# Patient Record
Sex: FEMALE | Race: WHITE | NOT HISPANIC OR LATINO | URBAN - METROPOLITAN AREA
[De-identification: names, ages, dates, MRNs, and addresses within clinical notes are randomized per-mention and may not be internally consistent; named-entity substitution may affect disease eponyms.]

---

## 2020-02-27 ENCOUNTER — AMBULATORY - HEALTHEAST (OUTPATIENT)
Dept: CARDIOLOGY | Facility: CLINIC | Age: 55
End: 2020-02-27

## 2020-02-27 ENCOUNTER — OFFICE VISIT - HEALTHEAST (OUTPATIENT)
Dept: CARDIOLOGY | Facility: CLINIC | Age: 55
End: 2020-02-27

## 2020-02-27 DIAGNOSIS — R07.2 PRECORDIAL PAIN: ICD-10-CM

## 2020-02-27 DIAGNOSIS — I31.9 PERICARDITIS: ICD-10-CM

## 2020-02-27 RX ORDER — OMEPRAZOLE 10 MG/1
10 CAPSULE, DELAYED RELEASE ORAL DAILY
Status: SHIPPED | COMMUNITY
Start: 2020-02-27

## 2020-02-27 RX ORDER — LEVOTHYROXINE SODIUM 125 UG/1
1 TABLET ORAL DAILY
Status: SHIPPED | COMMUNITY
Start: 2020-02-14

## 2020-02-27 ASSESSMENT — MIFFLIN-ST. JEOR: SCORE: 1325.24

## 2021-05-26 ENCOUNTER — RECORDS - HEALTHEAST (OUTPATIENT)
Dept: ADMINISTRATIVE | Facility: CLINIC | Age: 56
End: 2021-05-26

## 2021-06-04 VITALS
DIASTOLIC BLOOD PRESSURE: 74 MMHG | HEIGHT: 67 IN | HEART RATE: 76 BPM | RESPIRATION RATE: 16 BRPM | WEIGHT: 156 LBS | SYSTOLIC BLOOD PRESSURE: 116 MMHG | BODY MASS INDEX: 24.48 KG/M2

## 2021-06-06 NOTE — PATIENT INSTRUCTIONS - HE
1. Take 2 Aleve twice daily with food.  2. Call in 5 days if your pain is not significantly improved, we will schedule further testing.

## 2021-06-16 PROBLEM — I31.9 PERICARDITIS: Status: ACTIVE | Noted: 2020-02-27

## 2021-06-16 PROBLEM — R07.2 PRECORDIAL PAIN: Status: ACTIVE | Noted: 2020-02-27

## 2021-06-28 NOTE — PROGRESS NOTES
"Progress Notes by David Woodson MD at 2/27/2020  2:20 PM     Author: David Woodson MD Service: -- Author Type: Physician    Filed: 2/27/2020  3:12 PM Encounter Date: 2/27/2020 Status: Signed    : David Woodson MD (Physician)       CARDIOLOGY Rapid [facility] CLINIC CONSULT NOTE     Assessment/Plan:   1. Chest pain.  Persistent x48 hours, without evidence of myocardial infarction.  Suspect noncardiac etiology, possibly pericarditis though I hear no rub no effusion demonstrated on limited echo in ER.  Difficult to exclude entirely coronary disease, though appears unlikely.  Discussed coronary CT angiography or stress testing to exclude coronary disease as a cause.  Patient prefers a trial of empiric medical therapy first.  This is reasonable.  We will try Naprosyn twice daily for 5 days, with plan for coronary CT angiogram with calcium score if pain persists beyond this time.      Follow up dependent upon results treatment trial     History of Present Illness:     It is my pleasure to see Julia Marquis at the [facility] clinic for evaluation of chest pain.    Julia Marquis is a 54 y.o. female with a past medical history of hypothyroidism on treatment.  She also has a history of pericarditis from 20 years ago, treated with a course of anti-inflammatory agents.  She has a long history of hiatal hernia and chronic intermittent pain from this, well controlled with Prilosec OTC.  She does exercise regularly, though not as often as she did a couple of years ago.  She was on the elliptical with vigorous exercise 10 days ago with no problems.    2 days ago she awakened with sided chest \"heaviness\" without associated shortness of breath or diaphoresis.  She thought she was developing a viral syndrome but had no fevers or chills or sweats or rhinorrhea.  She then developed some lower chest burning sensation which persisted for 24 hours.  Then she sought evaluation in " "an urgent care center and subsequently in emergency room where ECG was normal troponins her D-dimers were within normal limits.  She did get some improvement with a Toradol injection.  She is referred here for follow-up.  The discomfort is \"4-5/10\" intensity, without change.  It is unaffected by food and is reminiscent of the discomfort she experienced with her pericarditis decades ago.  Is equivocally affected by deep respirations or position changes.    She does have a family history of coronary disease with her father suffering a myocardial infarction at age 58, and a sister at age 48.  She has been a vegetarian for decades, has no history of hyperlipidemia diabetes mellitus or tobacco abuse.    Past Medical History:     Patient Active Problem List   Diagnosis   ? Precordial pain       Past Surgical History:   History reviewed. No pertinent surgical history.    Family History:     Family History   Problem Relation Age of Onset   ? Acute Myocardial Infarction Father 58   ? Stroke Father 58     Family history reviewed and is not pertinent to the chief complaint or presenting problem    Social History:    reports that she has never smoked. She has never used smokeless tobacco. She reports that she does not use drugs.    Exercise: Elliptical exercise 10 days ago.  Working her way back up to 6 days a week like she was doing 2 years ago before an elbow injury    Sleep: Restorative, no daytime sleepiness    Meds:     Current Outpatient Medications on File Prior to Visit   Medication Sig Dispense Refill   ? CALCIUM ORAL Take 1,800 mg by mouth daily.     ? ergocalciferol, vitamin D2, 2,000 unit Tab Take 1 tablet by mouth daily.     ? levothyroxine (SYNTHROID, LEVOTHROID) 125 MCG tablet Take 1 tablet by mouth daily.     ? multivit-min/ferrous fumarate (MULTI VITAMIN ORAL) Take 1 tablet by mouth daily.     ? omeprazole (PRILOSEC) 10 MG capsule Take 10 mg by mouth daily.       No current facility-administered medications on " "file prior to visit.        Allergies:   Amoxicillin and Codeine    Review of Systems:     General: Night Sweats  Eyes: WNL  Ears/Nose/Throat: WNL  Lungs: WNL  Heart: Chest Pain  Stomach: WNL  Bladder: WNL  Muscle/Joints: WNL  Skin: WNL  Nervous System: WNL  Mental Health: WNL     Blood: WNL        Objective:      Physical Exam  156 lb (70.8 kg)  5' 7\" (1.702 m)  Body mass index is 24.43 kg/m .  /74 (Patient Site: Left Arm, Patient Position: Sitting, Cuff Size: Adult Regular)   Pulse 76   Resp 16   Ht 5' 7\" (1.702 m)   Wt 156 lb (70.8 kg)   BMI 24.43 kg/m      General Appearance : Awake, Alert, No acute distress  HEENT: No Scleral icterus; the mucous membranes were pink and moist.  Conjunctivae not injected  Neck:  No cervical bruits, jugular venous distention, or thyromegaly   Chest: The spine was straight. Chest wall symmetric.  No chest wall tenderness  Lungs: Respirations unlabored; the lungs are clear to auscultation.  No wheezing   Cardiovascular:   Normal point of maximal impulse.  Auscultation reveals normal first and second heart sounds with no murmurs, rubs, or gallops.  Carotid, radial, and dorsalis pedal pulses are intact and symmetric.  Abdomen: No organomegaly, masses, bruits, or tenderness. Bowels sounds are present  Extremities: No edema  Skin: No xanthelasma. Warm, Dry.  Musculoskeletal: No tenderness.  Neurologic: Alert and oriented ×3. Speech is fluent.      EKG(personally reviewed):  2/26/2020: Sinus bradycardia at 57 bpm.  Normal ECG.        Lab Review   Lab Results   Component Value Date     (H) 02/26/2020    K 3.9 02/26/2020     (H) 02/26/2020    CO2 27 02/26/2020    BUN 15 02/26/2020    CREATININE 0.66 02/26/2020    CALCIUM 10.0 02/26/2020     Lab Results   Component Value Date    WBC 4.3 02/26/2020    HGB 14.3 02/26/2020    HCT 42.8 02/26/2020    MCV 90 02/26/2020     02/26/2020     No results found for: CHOL, TRIG, HDL, LDLCALC  Lab Results   Component Value " Date    TROPONINI <0.01 02/26/2020     No results found for: BNP  No results found for: TSH    David Woodson MD [facility]      His note created using Dragon voice recognition software. Sound alike errors may have escaped editing.